# Patient Record
Sex: MALE | Race: WHITE | Employment: STUDENT | URBAN - METROPOLITAN AREA
[De-identification: names, ages, dates, MRNs, and addresses within clinical notes are randomized per-mention and may not be internally consistent; named-entity substitution may affect disease eponyms.]

---

## 2020-07-30 ENCOUNTER — LAB ENCOUNTER (OUTPATIENT)
Dept: LAB | Facility: HOSPITAL | Age: 20
End: 2020-07-30
Attending: NURSE PRACTITIONER
Payer: COMMERCIAL

## 2020-07-30 DIAGNOSIS — R05.9 COUGH: ICD-10-CM

## 2020-07-30 DIAGNOSIS — J34.89 RHINORRHEA: ICD-10-CM

## 2020-08-01 LAB — SARS-COV-2 BY PCR: NOT DETECTED

## 2021-11-08 ENCOUNTER — TELEPHONE (OUTPATIENT)
Dept: ORTHOPEDICS CLINIC | Facility: CLINIC | Age: 21
End: 2021-11-08

## 2021-11-08 NOTE — TELEPHONE ENCOUNTER
Spoke with patient and appt set with Dr. Leah Muse for follow up of foot fracture. Pt stated he has xrays and will bring them to the appt. Appt date/time/location confirmed with patient. Pt verbalized understanding. No further questions at this time.

## 2021-11-08 NOTE — TELEPHONE ENCOUNTER
Patients father is requesting patient be seen by Dr. Ashley Carrasquillo for a left foot fracture. Please advise if patient can be double booked.

## 2021-11-12 ENCOUNTER — OFFICE VISIT (OUTPATIENT)
Dept: ORTHOPEDICS CLINIC | Facility: CLINIC | Age: 21
End: 2021-11-12
Payer: OTHER MISCELLANEOUS

## 2021-11-12 VITALS — WEIGHT: 180 LBS | BODY MASS INDEX: 23.86 KG/M2 | HEIGHT: 73 IN

## 2021-11-12 DIAGNOSIS — T14.8XXA AVULSION FRACTURE: ICD-10-CM

## 2021-11-12 DIAGNOSIS — S90.31XA CONTUSION OF RIGHT FOOT, INITIAL ENCOUNTER: Primary | ICD-10-CM

## 2021-11-12 PROCEDURE — 3008F BODY MASS INDEX DOCD: CPT | Performed by: PODIATRIST

## 2021-11-12 PROCEDURE — 99203 OFFICE O/P NEW LOW 30 MIN: CPT | Performed by: PODIATRIST
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
## (undated) NOTE — LETTER
Date: 11/12/2021    Patient Name: Teddy Michael          To Whom it may concern: This letter has been written at the patient's request. The above patient was seen at one of the [facility] locations for treatment of a medical condition.